# Patient Record
Sex: FEMALE | Race: BLACK OR AFRICAN AMERICAN | NOT HISPANIC OR LATINO | Employment: FULL TIME | URBAN - METROPOLITAN AREA
[De-identification: names, ages, dates, MRNs, and addresses within clinical notes are randomized per-mention and may not be internally consistent; named-entity substitution may affect disease eponyms.]

---

## 2017-04-04 ENCOUNTER — CLINICAL SUPPORT (OUTPATIENT)
Dept: REHABILITATION | Facility: HOSPITAL | Age: 47
End: 2017-04-04
Attending: FAMILY MEDICINE
Payer: MEDICAID

## 2017-04-04 DIAGNOSIS — M54.5 LOW BACK PAIN, UNSPECIFIED BACK PAIN LATERALITY, UNSPECIFIED CHRONICITY, WITH SCIATICA PRESENCE UNSPECIFIED: Primary | ICD-10-CM

## 2017-04-04 PROBLEM — M54.50 LOW BACK PAIN: Status: ACTIVE | Noted: 2017-04-04

## 2017-04-04 PROCEDURE — 97161 PT EVAL LOW COMPLEX 20 MIN: CPT | Mod: PN | Performed by: PHYSICAL THERAPIST

## 2017-04-04 NOTE — PLAN OF CARE
"  TIME RECORD    Date: 04/04/2017    Start Time:  1205  Stop Time:  1240    PROCEDURES:    TIMED  Procedure Time Min.    Start:  Stop:     Start:  Stop:     Start:  Stop:     Start:  Stop:          UNTIMED  Procedure Time Min.    Start:  Stop:     Start:  Stop:      Total Timed Minutes:  0  Total Timed Units:  0  Total Untimed Units:  1  Charges Billed/# of units:  1    OUTPATIENT PHYSICAL THERAPY   PATIENT EVALUATION  Onset Date: 3 months ago  Primary Diagnosis:   1. Low back pain, unspecified back pain laterality, unspecified chronicity, with sciatica presence unspecified       Treatment Diagnosis: low back pain with L sciatica  No past medical history on file.   Precautions: Standard  Prior Therapy: No  Medications: Keanu Cunha currently has no medications in their medication list.  Nutrition:  Normal  History of Present Illness: Pain comes from L lower back, causing a shooting pain all the way down to her foot. At times it is hard to work. Pt had a steroid shot at MD office and that did help to reduce her symptoms so she can now walk better, but she is still having some of the pain. Still has parasthesia in the LLE but it is not nearly as debilitating. MD did explain that she has a bulging disc in her lower back and scoliosis.   Prior Level of Function: Independent  Social History: Lives at home with children. Pt is a student in school. Mental health couselor part time.   Place of Residence (Steps/Adaptations): [place] is on second floor of home. Has not had as much trouble with that since she had the injection.   Functional Deficits Leading to Referral/Nature of Injury: diffiuclty with walking, car transfers, sitting in bad posture, sleeping/getting comfortable in bed  Patient Therapy Goals: Show me exercises i need to do to get better.     Subjective     Keanu Cunha states "help me get my youngness back".    Pain:  Location: back   Description: Sharp, Shooting and feels like a charley horse  Activities Which " Increase Pain: Sitting, Laying, Bending and Getting out of bed/chair  Activities Which Decrease Pain: heating pad and changing position, muscle relaxers (does not use very often), the steroid injection she got  Pain Scale: 2/10 at best 3/10 now  9/10 at worst    Objective     Posture: pt is attempting to sit with neutral spinal posture and seems in no apparent discomfort/awkwardness in doing so. There is a mild R convex scoliotic curve in the T/L spine.  Palpation: Hypertonicity in B thoracic paraspinals.  Sensation: WNL in lumbar dermatomes  DTRs: 2+ in B patellar and achilles DTR  Range of Motion/Strength:   Lumbar AROM: Pain/Dysfunction with Movement:   Flexion 60*    Extension 15*    Right side bending 20*    Left side bending 20*    Right rotation 50%    Left rotation 25%      STRENGTH  Right  Left  Hip flex   4/5  4/5  Hip ext   4/5  4-/5  Hip abd  4+/5  4/5  Hip add  4+/5  4+/5  Knee flex  4/5  4-/5  Knee ext  4/5  4-/5  DF   4+/5  4+/5    Flexibility: R hamstring 60*  Gait: Without AD  Analysis: Assistance Trendelenburg (weak hip abductors)  Bed Mobility:Independent  Transfers: Independent  Special Tests: SLR L positive at 45*  Other: PIVM: hypomobility lower thoracic through lumbar spine  Oswestry: 20/50  Treatment: Instruction in CHIN exercise to improve mobility and promote centralization of symptoms in the LLE. Discussed PT POC with the pt.    Assessment       Initial Assessment (Pertinent finding, problem list and factors affecting outcome): Pt is a 48 y/o female with c/o low back pain and LLE pain for 3 months. Pt reports at one time she had difficulty walking due to the severity of LLE pain, however since she had a steroid injection she is feeling much better and symptoms have improved. There is still some pain present that affects her tolerance for transfers, bed mobiltiy and sleeping. Pt will benefit from PT to address her impairments and improve her tolerance for aforementioned activities with  decreased pain.   Rehab Potiential: good    Short Term Goals (3 Weeks):   1. Pt will be compliant with improving ergonomics of car seat and study chair to promote neutral spine in sitting.  2. Pt will be able to perform car transfers without pain.  Long Term Goals (6 Weeks):   1. Pt will report LBP <2/10 with household chores.  2. Pt will report no symptoms distal to the L hip.  3. Pt will score </=12/50 on Oswestry.    Plan     Certification Period: 4/4/17 to 5/16/17  Recommended Treatment Plan: 2 times per week for 6 weeks: Electrical Stimulation IFC for pain control, Gait Training, Group Therapy, Manual Therapy, Moist Heat/ Ice, Neuromuscular Re-ed, Patient Education, Therapeutic Activites, Therapeutic Exercise and Ultrasound/Phonophoresis  Other Recommendations: NA      Therapist: Ellen Tadeo, PT    I CERTIFY THE NEED FOR THESE SERVICES FURNISHED UNDER THIS PLAN OF TREATMENT AND WHILE UNDER MY CARE    Physician's comments: ________________________________________________________________________________________________________________________________________________      Physician's Name: ___________________________________

## 2017-05-30 ENCOUNTER — DOCUMENTATION ONLY (OUTPATIENT)
Dept: REHABILITATION | Facility: HOSPITAL | Age: 47
End: 2017-05-30

## 2017-05-30 NOTE — PT/OT/SLP DISCHARGE
Name: Keanu Cunha   [facility] Number: [id number]   Age: 47 y.o.   Diagnosis: No diagnosis found.   Physician: No ref. provider found   Original Orders : PT eval and treat  Initial visit: 4/4/17  Date of Last visit: 4/4/17  Date of Discharge Note:  5/30/17  Total Visits Received: 1  Missed Visits: all    Subjective: Pt did not return after her evaluation.     Objective:  Treatment :    Included:NA        ROM:  NA    UE Strength: NA   LE Strength: NA        Assessment:  NA due to non compliance.   Goals Achieved: NA  Goals Not achieved and why: NA due to non attendance    Discharge reason : Non-Compliance with attendance    Discharge plan :Pt to follow-up with MD as planned    Plan:  This patient is discharged from Physical Therapy Services.

## 2019-06-06 ENCOUNTER — OCCUPATIONAL HEALTH (OUTPATIENT)
Dept: URGENT CARE | Facility: CLINIC | Age: 49
End: 2019-06-06

## 2019-06-06 PROCEDURE — 80305 PR DRUG SCREEN - 1: ICD-10-PCS | Mod: S$GLB,,, | Performed by: EMERGENCY MEDICINE

## 2019-06-06 PROCEDURE — 80305 DRUG TEST PRSMV DIR OPT OBS: CPT | Mod: S$GLB,,, | Performed by: EMERGENCY MEDICINE

## 2019-08-14 ENCOUNTER — OCCUPATIONAL HEALTH (OUTPATIENT)
Dept: URGENT CARE | Facility: CLINIC | Age: 49
End: 2019-08-14

## 2019-08-14 DIAGNOSIS — Z02.83 ENCOUNTER FOR EMPLOYMENT-RELATED DRUG TESTING: ICD-10-CM

## 2019-08-14 PROCEDURE — 80305 PR DRUG SCREEN - 1: ICD-10-PCS | Mod: S$GLB,,, | Performed by: EMERGENCY MEDICINE

## 2019-08-14 PROCEDURE — 80305 DRUG TEST PRSMV DIR OPT OBS: CPT | Mod: S$GLB,,, | Performed by: EMERGENCY MEDICINE

## 2020-04-29 ENCOUNTER — TELEPHONE (OUTPATIENT)
Dept: ORTHOPEDICS | Facility: CLINIC | Age: 50
End: 2020-04-29

## 2020-08-13 DIAGNOSIS — M54.50 LOW BACK PAIN: Primary | ICD-10-CM

## 2020-08-14 ENCOUNTER — HOSPITAL ENCOUNTER (OUTPATIENT)
Dept: RADIOLOGY | Facility: HOSPITAL | Age: 50
Discharge: HOME | End: 2020-08-14
Attending: NURSE PRACTITIONER
Payer: MEDICAID

## 2020-08-14 DIAGNOSIS — M54.50 LOW BACK PAIN: ICD-10-CM

## 2020-08-14 PROCEDURE — 72110 X-RAY EXAM L-2 SPINE 4/>VWS: CPT | Mod: TC,PO

## 2020-08-20 ENCOUNTER — HOSPITAL ENCOUNTER (OUTPATIENT)
Dept: RADIOLOGY | Facility: HOSPITAL | Age: 50
Discharge: HOME | End: 2020-08-20
Attending: NURSE PRACTITIONER
Payer: MEDICAID

## 2020-08-20 DIAGNOSIS — M54.2 CERVICALGIA: ICD-10-CM

## 2020-08-20 DIAGNOSIS — M54.2 CERVICALGIA: Primary | ICD-10-CM

## 2020-08-20 PROCEDURE — 72050 X-RAY EXAM NECK SPINE 4/5VWS: CPT | Mod: TC,PO

## 2020-09-21 ENCOUNTER — HOSPITAL ENCOUNTER (OUTPATIENT)
Dept: RADIOLOGY | Facility: HOSPITAL | Age: 50
Discharge: HOME | End: 2020-09-21
Attending: NURSE PRACTITIONER
Payer: MEDICAID

## 2020-09-21 DIAGNOSIS — M25.551 PAIN IN RIGHT HIP: ICD-10-CM

## 2020-09-21 DIAGNOSIS — M25.551 PAIN IN RIGHT HIP: Primary | ICD-10-CM

## 2020-09-21 PROCEDURE — 73502 X-RAY EXAM HIP UNI 2-3 VIEWS: CPT | Mod: TC,PO,RT

## 2020-09-22 DIAGNOSIS — Z12.31 ENCOUNTER FOR SCREENING MAMMOGRAM FOR MALIGNANT NEOPLASM OF BREAST: Primary | ICD-10-CM

## 2020-09-23 ENCOUNTER — HOSPITAL ENCOUNTER (OUTPATIENT)
Dept: RADIOLOGY | Facility: HOSPITAL | Age: 50
Discharge: HOME | End: 2020-09-23
Attending: NURSE PRACTITIONER
Payer: MEDICAID

## 2020-09-23 DIAGNOSIS — Z12.31 ENCOUNTER FOR SCREENING MAMMOGRAM FOR MALIGNANT NEOPLASM OF BREAST: ICD-10-CM

## 2020-09-23 PROCEDURE — 77067 SCR MAMMO BI INCL CAD: CPT | Mod: TC,PO

## 2021-05-06 ENCOUNTER — PATIENT MESSAGE (OUTPATIENT)
Dept: RESEARCH | Facility: HOSPITAL | Age: 51
End: 2021-05-06

## 2022-04-28 ENCOUNTER — TELEPHONE (OUTPATIENT)
Dept: OPTOMETRY | Facility: CLINIC | Age: 52
End: 2022-04-28
Payer: MEDICAID

## 2022-09-13 DIAGNOSIS — M25.511 RIGHT SHOULDER PAIN: Primary | ICD-10-CM

## 2022-09-21 DIAGNOSIS — M25.511 PAIN IN RIGHT SHOULDER: Primary | ICD-10-CM

## 2022-09-22 ENCOUNTER — HOSPITAL ENCOUNTER (OUTPATIENT)
Dept: RADIOLOGY | Facility: HOSPITAL | Age: 52
Discharge: HOME | End: 2022-09-22
Attending: NURSE PRACTITIONER
Payer: MEDICAID

## 2022-09-22 DIAGNOSIS — M25.512 PAIN IN LEFT SHOULDER: Primary | ICD-10-CM

## 2022-09-22 DIAGNOSIS — M25.512 PAIN IN LEFT SHOULDER: ICD-10-CM

## 2022-09-22 PROCEDURE — 73030 X-RAY EXAM OF SHOULDER: CPT | Mod: TC,PO,LT

## 2023-02-28 ENCOUNTER — TELEPHONE (OUTPATIENT)
Dept: GASTROENTEROLOGY | Facility: CLINIC | Age: 53
End: 2023-02-28
Payer: MEDICAID

## 2023-02-28 NOTE — TELEPHONE ENCOUNTER
Pt requesting gi appt for gerd, foul odor from throat.  Appt scheduled on  Monday, March 6, 2023 at 230pm. Clinic appt given and repeated correctly.

## 2023-02-28 NOTE — TELEPHONE ENCOUNTER
----- Message from Madyson Marin sent at 2/28/2023  3:18 PM CST -----  Contact: called at [phone]  Type: Needs Medical Advice  Who Called:  Pt  Symptoms (please be specific):  Gerd, Acid reflux, coughing, choking while drinking, foul order from throat  How long has patient had these symptoms:  on going for years  Best Call Back Number: [phone]  Additional Information: The pt is calling to get an appt to be seen for the symptoms listed above. Please call back and advise.

## 2023-03-06 ENCOUNTER — OFFICE VISIT (OUTPATIENT)
Dept: GASTROENTEROLOGY | Facility: CLINIC | Age: 53
End: 2023-03-06
Payer: MEDICAID

## 2023-03-06 VITALS
HEIGHT: 66 IN | SYSTOLIC BLOOD PRESSURE: 139 MMHG | BODY MASS INDEX: 27.33 KG/M2 | DIASTOLIC BLOOD PRESSURE: 74 MMHG | TEMPERATURE: 99 F | WEIGHT: 170.06 LBS | HEART RATE: 105 BPM

## 2023-03-06 DIAGNOSIS — R13.10 DYSPHAGIA, UNSPECIFIED TYPE: ICD-10-CM

## 2023-03-06 DIAGNOSIS — Z12.11 SCREEN FOR COLON CANCER: Primary | ICD-10-CM

## 2023-03-06 PROCEDURE — 99213 OFFICE O/P EST LOW 20 MIN: CPT | Mod: PBBFAC,PO | Performed by: INTERNAL MEDICINE

## 2023-03-06 RX ORDER — SODIUM, POTASSIUM,MAG SULFATES 17.5-3.13G
1 SOLUTION, RECONSTITUTED, ORAL ORAL DAILY
Qty: 1 KIT | Refills: 0 | Status: SHIPPED | OUTPATIENT
Start: 2023-03-06 | End: 2023-03-08

## 2023-03-06 RX ORDER — LISINOPRIL 10 MG/1
10 TABLET ORAL DAILY
COMMUNITY

## 2023-03-06 NOTE — PROGRESS NOTES
[facility]    CC: trouble swallowing    HPI 53 y.o. female with PMH of Hypertension with chief complaint of dysphagia with solid foods, not with liquids with associated foul smelling odor from her throat, reflux and nausea. Patient has tried Omeprazole 40 mg every day in the past without symptomatic improvement. She has never had an endoscopy and colonoscopy in the past. She denies family history of esophageal, stomach, or colon cancer. She denies NSAID use.     Past Medical History  Past Medical History:   Diagnosis Date    Anemia     Hypertension      Review of Systems  General ROS: negative for chills, fever or weight loss  Cardiovascular ROS: no chest pain or dyspnea on exertion  Gastrointestinal ROS: no abdominal pain, change in bowel habits, or black/ bloody stools    Physical Examination  There were no vitals taken for this visit.  General appearance: alert, cooperative, no distress  HENT: Normocephalic, atraumatic, neck symmetrical, no nasal discharge   Abdomen: soft, non-tender; bowel sounds normoactive; no organomegaly    Assessment: Patient presents with dysphagia with solid foods, foul smelling odor, and reflux. Previous PPI therapy without improvement in symptoms. Never had EGD or colonoscopy, or previous workup in the past. Patient's presentation concerning for achalasia vs Zenker diverticulum vs esophageal cancer.    Plan:  - Plan for EGD/colonoscopy on 3/13  - If results non-revealing, can consider esophogram or manometry testing  - Educated patient on meal hygiene - small bites, sitting up when eating, sips of water in between bites  - No PPI as patient has tried in the past with no improvement in symptoms  Total encounter time >45 min   Tacho Shafer MD   [address], [address]   [place] [address] [phone]

## 2023-03-06 NOTE — PATIENT INSTRUCTIONS
SUPREP Instructions    [facility] [address]  Clinic Office [phone]  Endoscopy Lab [phone]    You are scheduled for a EGD/Colonoscopy with Dr. Shafer  on Monday, March 13, 2023 at [facility] in [place].    Check in at the Hospital -1st floor, Information desk.   Call [phone] to reschedule.    An adult friend/family member must come with you to drive you home.  You cannot drive, take a taxi, Uber/Lyft or bus to leave the Endoscopy Center alone.  If you do not have someone to drive you home, your test will be cancelled.     Please follow the directions of your doctor if you take any pills that thin your blood. If you take these meds: Aggrenox, Brilinta, Effient, Eliquis, Lovenox, Plavix, Pletal, Pradaxa, Ticilid, Xarelto or Coumadin, let the doctor's office know.    DON'T: On the morning of the test do not take insulin or pills for diabetes.     DO: On the morning of the test, do take any pills for blood pressure, heart, anti-rejection and or seizures with a small sip of water. Bring any inhalers with you.    To have a good prep, you must follow these instructions - please do not use the directions from the pharmacy.    The doctor will send a prescription for the SUPREP.      The Day Before the test:    You can only drink CLEAR LIQUIDS the whole day before your test.  You can't eat any food for the whole day.    You CAN have:  Water, Coffee or decaf coffee (no milk or cream)  Tea  Soft drinks - regular and sugar free  Jello (green or yellow)  Apple Juice, white grape juice, white cranberry juice  Gatorade, Power Aid, Crystal Light, Mathew Aid  Lemonade and Limeade  Bouillon, clear soup  Snowball, popsicles  YOU CAN'T DRINK ANYTHING RED, PURPLE ORANGE OR BLUE   YOU CAN'T DRINK ALCOHOL  ONLY DRINK WHAT IS ON THE LIST      At 5 pm the night before your test:    Pour the 1st bottle of SUPREP into the cup provided in the box. Add water to the line on the cup and mix well.   Drink the whole cup and then drink 2 more full cups of water over 1 hour.  This can be easier to drink if it is cold. You can mix it 20 minutes ahead of time and place in the refrigerator before you drink it.  You must drink it within 30-45 minutes of mixing it.  Do NOT pour the drink over ice.  You can drink it with a straw.    The Day of the test - We will call you 2 days before your test to tell you what time to get there.    5 hours before you come to the hospital (this may be in the middle of the night)  Pour the 2nd bottle of SUPREP into the cup provided in the box. Add water to the line on the cup and mix well.  Drink the whole cup and then drink 2 more full cups of water over 1 hour.  It might be easier to drink if it is cold. You can mix it 20 minutes ahead of time and place in the refrigerator before you drink it.  You must drink it within 30-45 minutes of mixing it.  Do NOT pour the drink over ice.  You can drink it with a straw.    YOU CAN'T EAT OR DRINK ANYTHING ELSE ONCE YOU FINISH THE PREP    Leave all valuables and jewelry at home. You will be at the hospital for 2-4 hours.    Call the Endoscopy department at [phone] with any questions about your procedure.

## 2023-03-08 ENCOUNTER — TELEPHONE (OUTPATIENT)
Dept: ENDOSCOPY | Facility: HOSPITAL | Age: 53
End: 2023-03-08
Payer: MEDICAID

## 2023-03-08 NOTE — TELEPHONE ENCOUNTER
Spoke with patient about arrival time @ 0700.   EGD/Colon    Prep instructions reviewed: the day before the procedure, follow a clear liquid diet all day, then start the first 1/2 of prep at 5pm and take 2nd 1/2 of prep @ 0200.  Pt must be completely NPO when prep completed @ 0400.              Medications: Do not take Insulin or oral diabetic medications the day of the procedure.  Take as prescribed: heart, seizure and blood pressure medication in the morning with a sip of water (less than an ounce).  Take any breathing medications and bring inhalers to hospital with you Leave all valuables and jewelry at home.     Wear comfortable clothes to procedure to change into hospital gown You cannot drive for 24 hours after your procedure because you will receive sedation for your procedure to make you comfortable.  A ride must be provided at discharge.

## 2023-03-12 ENCOUNTER — ANESTHESIA EVENT (OUTPATIENT)
Dept: ENDOSCOPY | Facility: HOSPITAL | Age: 53
End: 2023-03-12
Payer: MEDICAID

## 2023-03-12 NOTE — H&P
[facility]    CC: trouble swallowing    HPI 53 y.o. female with PMH of Hypertension with chief complaint of dysphagia with solid foods, not with liquids with associated foul smelling odor from her throat, reflux and nausea. Patient has tried Omeprazole 40 mg every day in the past without symptomatic improvement. She has never had an endoscopy and colonoscopy in the past. She denies family history of esophageal, stomach, or colon cancer. She denies NSAID use.     Past Medical History  Past Medical History:   Diagnosis Date    Anemia     Hypertension      Review of Systems  General ROS: negative for chills, fever or weight loss  Cardiovascular ROS: no chest pain or dyspnea on exertion  Gastrointestinal ROS: no abdominal pain, change in bowel habits, or black/ bloody stools    Physical Examination  There were no vitals taken for this visit.  General appearance: alert, cooperative, no distress  HENT: Normocephalic, atraumatic, neck symmetrical, no nasal discharge   Abdomen: soft, non-tender; bowel sounds normoactive; no organomegaly    Assessment: Patient presents with dysphagia with solid foods, foul smelling odor, and reflux. Previous PPI therapy without improvement in symptoms. Never had EGD or colonoscopy, or previous workup in the past. Patient's presentation concerning for achalasia vs Zenker diverticulum vs esophageal cancer.    Plan:  - EGD/colonoscopy today  - If results non-revealing, can consider esophogram or manometry testing    [address], [address]   [place] [address] [phone]

## 2023-03-13 ENCOUNTER — ANESTHESIA (OUTPATIENT)
Dept: ENDOSCOPY | Facility: HOSPITAL | Age: 53
End: 2023-03-13
Payer: MEDICAID

## 2023-03-13 ENCOUNTER — HOSPITAL ENCOUNTER (OUTPATIENT)
Facility: HOSPITAL | Age: 53
Discharge: HOME | End: 2023-03-13
Attending: INTERNAL MEDICINE | Admitting: INTERNAL MEDICINE
Payer: MEDICAID

## 2023-03-13 VITALS
DIASTOLIC BLOOD PRESSURE: 61 MMHG | HEART RATE: 75 BPM | RESPIRATION RATE: 20 BRPM | SYSTOLIC BLOOD PRESSURE: 126 MMHG | TEMPERATURE: 99 F | OXYGEN SATURATION: 100 % | HEIGHT: 66 IN | WEIGHT: 170 LBS | BODY MASS INDEX: 27.32 KG/M2

## 2023-03-13 DIAGNOSIS — R13.19 ESOPHAGEAL DYSPHAGIA: Primary | ICD-10-CM

## 2023-03-13 DIAGNOSIS — Z12.11 SCREEN FOR COLON CANCER: ICD-10-CM

## 2023-03-13 PROCEDURE — D9220A PRA ANESTHESIA: Mod: ANES,,, | Performed by: ANESTHESIOLOGY

## 2023-03-13 PROCEDURE — D9220A PRA ANESTHESIA: ICD-10-PCS | Mod: ANES,,, | Performed by: ANESTHESIOLOGY

## 2023-03-13 PROCEDURE — 43239 EGD BIOPSY SINGLE/MULTIPLE: CPT | Performed by: INTERNAL MEDICINE

## 2023-03-13 PROCEDURE — D9220A PRA ANESTHESIA: ICD-10-PCS | Mod: CRNA,,, | Performed by: NURSE ANESTHETIST, CERTIFIED REGISTERED

## 2023-03-13 PROCEDURE — D9220A PRA ANESTHESIA: Mod: CRNA,,, | Performed by: NURSE ANESTHETIST, CERTIFIED REGISTERED

## 2023-03-13 PROCEDURE — 37000009 HC ANESTHESIA EA ADD 15 MINS: Performed by: INTERNAL MEDICINE

## 2023-03-13 PROCEDURE — 00813 ANES UPR LWR GI NDSC PX: CPT | Performed by: INTERNAL MEDICINE

## 2023-03-13 PROCEDURE — 45378 DIAGNOSTIC COLONOSCOPY: CPT | Performed by: INTERNAL MEDICINE

## 2023-03-13 PROCEDURE — 88305 TISSUE EXAM BY PATHOLOGIST: ICD-10-PCS | Mod: 26,,, | Performed by: PATHOLOGY

## 2023-03-13 PROCEDURE — 63600175 PHARM REV CODE 636 W HCPCS: Performed by: NURSE ANESTHETIST, CERTIFIED REGISTERED

## 2023-03-13 PROCEDURE — 27201012 HC FORCEPS, HOT/COLD, DISP: Performed by: INTERNAL MEDICINE

## 2023-03-13 PROCEDURE — 88305 TISSUE EXAM BY PATHOLOGIST: CPT | Mod: 26,,, | Performed by: PATHOLOGY

## 2023-03-13 PROCEDURE — 37000008 HC ANESTHESIA 1ST 15 MINUTES: Performed by: INTERNAL MEDICINE

## 2023-03-13 PROCEDURE — 25000003 PHARM REV CODE 250: Performed by: INTERNAL MEDICINE

## 2023-03-13 PROCEDURE — 88305 TISSUE EXAM BY PATHOLOGIST: CPT | Performed by: PATHOLOGY

## 2023-03-13 PROCEDURE — 25000003 PHARM REV CODE 250: Performed by: NURSE ANESTHETIST, CERTIFIED REGISTERED

## 2023-03-13 RX ORDER — SODIUM CHLORIDE 0.9 % (FLUSH) 0.9 %
10 SYRINGE (ML) INJECTION
Status: DISCONTINUED | OUTPATIENT
Start: 2023-03-13 | End: 2023-03-13 | Stop reason: HOSPADM

## 2023-03-13 RX ORDER — SODIUM CHLORIDE 9 MG/ML
INJECTION, SOLUTION INTRAVENOUS CONTINUOUS
Status: DISCONTINUED | OUTPATIENT
Start: 2023-03-13 | End: 2023-03-13 | Stop reason: HOSPADM

## 2023-03-13 RX ORDER — LIDOCAINE HCL/PF 100 MG/5ML
SYRINGE (ML) INTRAVENOUS
Status: DISCONTINUED | OUTPATIENT
Start: 2023-03-13 | End: 2023-03-13

## 2023-03-13 RX ORDER — PROPOFOL 10 MG/ML
VIAL (ML) INTRAVENOUS CONTINUOUS PRN
Status: DISCONTINUED | OUTPATIENT
Start: 2023-03-13 | End: 2023-03-13

## 2023-03-13 RX ORDER — PROPOFOL 10 MG/ML
VIAL (ML) INTRAVENOUS
Status: DISCONTINUED | OUTPATIENT
Start: 2023-03-13 | End: 2023-03-13

## 2023-03-13 RX ORDER — ONDANSETRON 2 MG/ML
4 INJECTION INTRAMUSCULAR; INTRAVENOUS DAILY PRN
Status: DISCONTINUED | OUTPATIENT
Start: 2023-03-13 | End: 2023-03-13 | Stop reason: HOSPADM

## 2023-03-13 RX ORDER — SODIUM CHLORIDE 9 MG/ML
INJECTION, SOLUTION INTRAVENOUS CONTINUOUS PRN
Status: DISCONTINUED | OUTPATIENT
Start: 2023-03-13 | End: 2023-03-13

## 2023-03-13 RX ADMIN — PROPOFOL 150 MCG/KG/MIN: 10 INJECTION, EMULSION INTRAVENOUS at 08:03

## 2023-03-13 RX ADMIN — LIDOCAINE HYDROCHLORIDE 50 MG: 20 INJECTION, SOLUTION INTRAVENOUS at 08:03

## 2023-03-13 RX ADMIN — PROPOFOL 50 MG: 10 INJECTION, EMULSION INTRAVENOUS at 08:03

## 2023-03-13 RX ADMIN — SODIUM CHLORIDE: 0.9 INJECTION, SOLUTION INTRAVENOUS at 07:03

## 2023-03-13 RX ADMIN — GLYCOPYRROLATE 0.2 MG: 0.2 INJECTION, SOLUTION INTRAMUSCULAR; INTRAVITREAL at 08:03

## 2023-03-13 RX ADMIN — SODIUM CHLORIDE: 9 INJECTION, SOLUTION INTRAVENOUS at 08:03

## 2023-03-13 NOTE — ANESTHESIA PREPROCEDURE EVALUATION
03/13/2023  Keanu Cunha is a 53 y.o., female.      Pre-op Assessment    I have reviewed the NPO Status.   I have reviewed the Medications.     Review of Systems  Anesthesia Hx:  No problems with previous Anesthesia  Denies Family Hx of Anesthesia complications.   Denies Personal Hx of Anesthesia complications.   Hematology/Oncology:  Hematology Normal   Oncology Normal     EENT/Dental:EENT/Dental Normal   Cardiovascular:   Hypertension    Pulmonary:  Pulmonary Normal    Renal/:  Renal/ Normal     Hepatic/GI:  Hepatic/GI Normal    Musculoskeletal:  Spine Disorders: lumbar    Neurological:  Neurology Normal    Endocrine:  Endocrine Normal    Dermatological:  Skin Normal    Psych:  Psychiatric Normal           Physical Exam  General: Alert and Oriented    Airway:  Mallampati: II   Mouth Opening: Normal  TM Distance: Normal  Tongue: Normal  Neck ROM: Normal ROM    Chest/Lungs:  Clear to auscultation, Normal Respiratory Rate    Heart:  Rate: Normal  Rhythm: Regular Rhythm  Sounds: Normal        Anesthesia Plan  Type of Anesthesia, risks & benefits discussed:    Anesthesia Type: Gen Natural Airway  Intra-op Monitoring Plan: Standard ASA Monitors  Post Op Pain Control Plan: multimodal analgesia and IV/PO Opioids PRN  Induction:  IV  Informed Consent: Informed consent signed with the Patient and all parties understand the risks and agree with anesthesia plan.  All questions answered.   ASA Score: 2  Day of Surgery Review of History & Physical: H&P Update referred to the surgeon/provider.    Ready For Surgery From Anesthesia Perspective.     .

## 2023-03-13 NOTE — TRANSFER OF CARE
"Anesthesia Transfer of Care Note    Patient: Keanu Cunha    Procedure(s) Performed: Procedure(s) (LRB):  COLONOSCOPY (N/A)  EGD (ESOPHAGOGASTRODUODENOSCOPY) (N/A)    Patient location: PACU    Anesthesia Type: general    Transport from OR: Transported from OR on room air with adequate spontaneous ventilation    Post pain: adequate analgesia    Post assessment: no apparent anesthetic complications and tolerated procedure well    Post vital signs: stable    Level of consciousness: awake and responds to stimulation    Nausea/Vomiting: no nausea/vomiting    Complications: none    Transfer of care protocol was followed      Last vitals:   Visit Vitals  BP (!) 143/108 (BP Location: Left arm, Patient Position: Lying)   Pulse 88   Temp 36.6 °C (97.9 °F) (Temporal)   Resp 16   Ht 5' 6" (1.676 m)   Wt 77.1 kg (170 lb)   SpO2 100%   Breastfeeding No   BMI 27.44 kg/m²     "

## 2023-03-13 NOTE — PROVATION PATIENT INSTRUCTIONS
Discharge Summary/Instructions after an Endoscopic Procedure  Patient Name: Keanu Cunha  Patient MRN: [id number]  Patient YOB: 1970 Monday, March 13, 2023  Tacho Shafer MD  Dear patient,  As a result of recent federal legislation (The Federal Cures Act), you may   receive lab or pathology results from your procedure in your [facility]   account before your physician is able to contact you. Your physician or   their representative will relay the results to you with their   recommendations at their soonest availability.  Thank you,  Your health is very important to us during the Covid Crisis. Following your   procedure today, you will receive a daily text for 2 weeks asking about   signs or symptoms of Covid 19.  Please respond to this text when you   receive it so we can follow up and keep you as safe as possible.   RESTRICTIONS:  During your procedure today, you received medications for sedation.  These   medications may affect your judgment, balance and coordination.  Therefore,   for 24 hours, you have the following restrictions:   - DO NOT drive a car, operate machinery, make legal/financial decisions,   sign important papers or drink alcohol.    ACTIVITY:  Today: no heavy lifting, straining or running due to procedural   sedation/anesthesia.  The following day: return to full activity including work.  DIET:  Eat and drink normally unless instructed otherwise.     TREATMENT FOR COMMON SIDE EFFECTS:  - Mild abdominal pain, nausea, belching, bloating or excessive gas:  rest,   eat lightly and use a heating pad.  - Sore Throat: treat with throat lozenges and/or gargle with warm salt   water.  - Because air was used during the procedure, expelling large amounts of air   from your rectum or belching is normal.  - If a bowel prep was taken, you may not have a bowel movement for 1-3 days.    This is normal.  SYMPTOMS TO WATCH FOR AND REPORT TO YOUR PHYSICIAN:  1. Abdominal pain or bloating, other than gas  cramps.  2. Chest pain.  3. Back pain.  4. Signs of infection such as: chills or fever occurring within 24 hours   after the procedure.  5. Rectal bleeding, which would show as bright red, maroon, or black stools.   (A tablespoon of blood from the rectum is not serious, especially if   hemorrhoids are present.)  6. Vomiting.  7. Weakness or dizziness.  GO DIRECTLY TO THE NEAREST EMERGENCY ROOM IF YOU HAVE ANY OF THE FOLLOWING:      Difficulty breathing              Chills and/or fever over 101 F   Persistent vomiting and/or vomiting blood   Severe abdominal pain   Severe chest pain   Black, tarry stools   Bleeding- more than one tablespoon   Any other symptom or condition that you feel may need urgent attention  Your doctor recommends these additional instructions:  If any biopsies were taken, your doctors clinic will contact you in 1 to 2   weeks with any results.  - Discharge to home  - Resume previous diet and medications  - Condition stable   - The signs and symptoms of potential delayed complications were discussed   with the patient. If signs or symptoms of these complications develop, call   the [facility] at [phone].   - Return to normal activities tomorrow.  Written discharge instructions were   provided to the patient.   - Repeat colonoscopy in 10 years for screening purposes.  For questions, problems or results please call your physician - Tacho Shafer MD.  EMERGENCY PHONE NUMBER: [phone],  LAB RESULTS: [phone]  IF A COMPLICATION OR EMERGENCY SITUATION ARISES AND YOU ARE UNABLE TO REACH   YOUR PHYSICIAN - GO DIRECTLY TO THE EMERGENCY ROOM.  MD Tacho Wright MD  3/13/2023 9:24:50 AM  This report has been verified and signed electronically.  Dear patient,  As a result of recent federal legislation (The Federal Cures Act), you may   receive lab or pathology results from your procedure in your [facility]   account before your physician is able to contact  you. Your physician or   their representative will relay the results to you with their   recommendations at their soonest availability.  Thank you,  PROVATION

## 2023-03-13 NOTE — PROVATION PATIENT INSTRUCTIONS
Discharge Summary/Instructions after an Endoscopic Procedure  Patient Name: Keanu Cunha  Patient MRN: [id number]  Patient YOB: 1970 Monday, March 13, 2023  Tacho Shafer MD  Dear patient,  As a result of recent federal legislation (The Federal Cures Act), you may   receive lab or pathology results from your procedure in your [facility]   account before your physician is able to contact you. Your physician or   their representative will relay the results to you with their   recommendations at their soonest availability.  Thank you,  Your health is very important to us during the Covid Crisis. Following your   procedure today, you will receive a daily text for 2 weeks asking about   signs or symptoms of Covid 19.  Please respond to this text when you   receive it so we can follow up and keep you as safe as possible.   RESTRICTIONS:  During your procedure today, you received medications for sedation.  These   medications may affect your judgment, balance and coordination.  Therefore,   for 24 hours, you have the following restrictions:   - DO NOT drive a car, operate machinery, make legal/financial decisions,   sign important papers or drink alcohol.    ACTIVITY:  Today: no heavy lifting, straining or running due to procedural   sedation/anesthesia.  The following day: return to full activity including work.  DIET:  Eat and drink normally unless instructed otherwise.     TREATMENT FOR COMMON SIDE EFFECTS:  - Mild abdominal pain, nausea, belching, bloating or excessive gas:  rest,   eat lightly and use a heating pad.  - Sore Throat: treat with throat lozenges and/or gargle with warm salt   water.  - Because air was used during the procedure, expelling large amounts of air   from your rectum or belching is normal.  - If a bowel prep was taken, you may not have a bowel movement for 1-3 days.    This is normal.  SYMPTOMS TO WATCH FOR AND REPORT TO YOUR PHYSICIAN:  1. Abdominal pain or bloating, other than gas  cramps.  2. Chest pain.  3. Back pain.  4. Signs of infection such as: chills or fever occurring within 24 hours   after the procedure.  5. Rectal bleeding, which would show as bright red, maroon, or black stools.   (A tablespoon of blood from the rectum is not serious, especially if   hemorrhoids are present.)  6. Vomiting.  7. Weakness or dizziness.  GO DIRECTLY TO THE NEAREST EMERGENCY ROOM IF YOU HAVE ANY OF THE FOLLOWING:      Difficulty breathing              Chills and/or fever over 101 F   Persistent vomiting and/or vomiting blood   Severe abdominal pain   Severe chest pain   Black, tarry stools   Bleeding- more than one tablespoon   Any other symptom or condition that you feel may need urgent attention  Your doctor recommends these additional instructions:  If any biopsies were taken, your doctors clinic will contact you in 1 to 2   weeks with any results.  - Resume previous diet.   - Continue present medications.   - Await pathology results.   - Perform a colonoscopy today.   - If pathology is negative for EoE as expected then plan conservative   management with dietary modifications.  For questions, problems or results please call your physician - Tacho Shafer MD.  EMERGENCY PHONE NUMBER: [phone],  LAB RESULTS: [phone]  IF A COMPLICATION OR EMERGENCY SITUATION ARISES AND YOU ARE UNABLE TO REACH   YOUR PHYSICIAN - GO DIRECTLY TO THE EMERGENCY ROOM.  MD Tacho Wright MD  3/13/2023 9:19:50 AM  This report has been verified and signed electronically.  Dear patient,  As a result of recent federal legislation (The Federal Cures Act), you may   receive lab or pathology results from your procedure in your [facility]   account before your physician is able to contact you. Your physician or   their representative will relay the results to you with their   recommendations at their soonest availability.  Thank you,  [employer]ATION

## 2023-03-13 NOTE — ANESTHESIA POSTPROCEDURE EVALUATION
Anesthesia Post Evaluation    Patient: Keanu Cunha    Procedure(s) Performed: Procedure(s) (LRB):  COLONOSCOPY (N/A)  EGD (ESOPHAGOGASTRODUODENOSCOPY) (N/A)    Final Anesthesia Type: general      Patient location during evaluation: GI PACU  Patient participation: Yes- Able to Participate  Level of consciousness: awake  Post-procedure vital signs: reviewed and stable  Pain management: adequate  Airway patency: patent    PONV status at discharge: No PONV  Anesthetic complications: no      Cardiovascular status: blood pressure returned to baseline and hemodynamically stable  Respiratory status: unassisted, spontaneous ventilation and room air  Hydration status: euvolemic  Follow-up not needed.          Vitals Value Taken Time   /59 03/13/23 0916   Temp 97.8 03/13/23 0916   Pulse 87 03/13/23 0916   Resp 18 03/13/23 0916   SpO2 98 03/13/23 0916         No case tracking events are documented in the log.      Pain/Henrry Score: Henrry Score: 9 (3/13/2023  9:14 AM)

## 2023-03-15 PROBLEM — R13.19 ESOPHAGEAL DYSPHAGIA: Status: ACTIVE | Noted: 2023-03-15

## 2023-03-16 LAB
FINAL PATHOLOGIC DIAGNOSIS: NORMAL
GROSS: NORMAL
Lab: NORMAL

## 2023-03-22 ENCOUNTER — OFFICE VISIT (OUTPATIENT)
Dept: GASTROENTEROLOGY | Facility: CLINIC | Age: 53
End: 2023-03-22
Payer: MEDICAID

## 2023-03-22 ENCOUNTER — TELEPHONE (OUTPATIENT)
Dept: GASTROENTEROLOGY | Facility: CLINIC | Age: 53
End: 2023-03-22
Payer: MEDICAID

## 2023-03-22 DIAGNOSIS — R13.10 DYSPHAGIA, UNSPECIFIED TYPE: Primary | ICD-10-CM

## 2023-03-22 NOTE — TELEPHONE ENCOUNTER
----- Message from Tacho Shafer MD sent at 3/22/2023  9:43 AM CDT -----  Please schedule for telemedicine visit today to review path results and symptoms

## 2023-03-22 NOTE — PROGRESS NOTES
Established Patient - Audio Only Telehealth Visit     The patient location is: home  The chief complaint leading to consultation is: dysphagia  Visit type: Virtual visit with audio only (telephone)  Total time spent with patient: 10 minutes  Total encounter time >30 min      The reason for the audio only service rather than synchronous audio and video virtual visit was related to technical difficulties or patient preference/necessity.     Each patient to whom I provide medical services by telemedicine is:  (1) informed of the relationship between the physician and patient and the respective role of any other health care provider with respect to management of the patient; and (2) notified that they may decline to receive medical services by telemedicine and may withdraw from such care at any time. Patient verbally consented to receive this service via voice-only telephone call.     HPI:   53 y.o. female with PMH of Hypertension with chief complaint of dysphagia with solid foods, not with liquids with associated foul smelling odor from her throat, reflux and nausea. Patient has tried Omeprazole 40 mg every day in the past without symptomatic improvement. She has never had an endoscopy and colonoscopy in the past. She denies family history of esophageal, stomach, or colon cancer. She denies NSAID use.    Patient underwent EGD on 3/13 with normal esophagus, normal stomach, and normal duodenum. Biopsies at that time negative for eosinophilic esophagitis, and without any other diagnostic abnormality.    Today, patient reports that her symptoms of dysphagia are improved with lifestyle modifications, although the foul odor and reflux are still present. She is adherent to PPI therapy.      Assessment:  52 yo F with chronic, persistent dysphagia with solid foods, foul smelling odor, and reflux. EGD on 3/13 with normal esophagus, biopsies at that time negative for eosinophilic esophagitis, no other diagnostic abnormality.  Symptoms of dysphagia improved today with lifestyle modifications such as small bites with food, sips of water with every bite.     Plan:  - Patient wishes to continue with conservative management at this time  - Continue conservative therapy at the moment: PPI therapy, PRN pepcid or tums, and lifestyle modifications as above  - If symptoms worsen, can consider esophagram or esophageal manometry for further workup     This service was not originating from a related E/M service provided within the previous 7 days nor will  to an E/M service or procedure within the next 24 hours or my soonest available appointment.  Prevailing standard of care was able to be met in this audio-only visit.

## 2023-04-17 DIAGNOSIS — Z12.31 ENCOUNTER FOR SCREENING MAMMOGRAM FOR MALIGNANT NEOPLASM OF BREAST: Primary | ICD-10-CM

## 2023-04-28 ENCOUNTER — HOSPITAL ENCOUNTER (OUTPATIENT)
Dept: RADIOLOGY | Facility: HOSPITAL | Age: 53
Discharge: HOME | End: 2023-04-28
Attending: NURSE PRACTITIONER
Payer: MEDICAID

## 2023-04-28 DIAGNOSIS — Z12.31 ENCOUNTER FOR SCREENING MAMMOGRAM FOR MALIGNANT NEOPLASM OF BREAST: ICD-10-CM

## 2023-04-28 PROCEDURE — 77067 SCR MAMMO BI INCL CAD: CPT | Mod: TC,PO

## 2023-11-08 NOTE — TELEPHONE ENCOUNTER
----- Message from Yi Arana MA sent at 4/29/2020  1:58 PM CDT -----  Contact: self  Patient need to speak to nurse regarding scheduling a new patient appointment for wrist pain     Patient was informed about her insurance but states Medicaid gave Dr. Esparza       Please call to advice [phone]     Patient picked up aids/ needed to charge them when she gets home to see if working properly,they were dead.

## 2023-12-01 ENCOUNTER — TELEPHONE (OUTPATIENT)
Dept: GASTROENTEROLOGY | Facility: CLINIC | Age: 53
End: 2023-12-01
Payer: MEDICAID

## 2023-12-01 NOTE — TELEPHONE ENCOUNTER
Clinic appt scheduled with pt on Monday, December 11, 2023 at 145pm, per pt request.  Pt given clinic address and repeated correctly.

## 2023-12-01 NOTE — TELEPHONE ENCOUNTER
----- Message from Kayla Loaiza sent at 12/1/2023 10:08 AM CST -----  Type:  Sooner Appointment Request    Caller is requesting a sooner appointment.  Caller declined first available appointment listed below.  Caller will not accept being placed on the waitlist and is requesting a message be sent to doctor.  Name of Caller:pt   When is the first available appointment?  Symptoms:Constipation and breath odor   Would the patient rather a call back or a response via [facility]? Call   Best Call Back Number:[phone]  Additional Information:

## 2023-12-08 ENCOUNTER — TELEPHONE (OUTPATIENT)
Dept: GASTROENTEROLOGY | Facility: CLINIC | Age: 53
End: 2023-12-08
Payer: MEDICAID

## 2023-12-11 ENCOUNTER — OFFICE VISIT (OUTPATIENT)
Dept: GASTROENTEROLOGY | Facility: CLINIC | Age: 53
End: 2023-12-11
Payer: MEDICAID

## 2023-12-11 ENCOUNTER — TELEPHONE (OUTPATIENT)
Dept: GASTROENTEROLOGY | Facility: CLINIC | Age: 53
End: 2023-12-11
Payer: MEDICAID

## 2023-12-11 VITALS
SYSTOLIC BLOOD PRESSURE: 127 MMHG | HEART RATE: 95 BPM | WEIGHT: 174.25 LBS | DIASTOLIC BLOOD PRESSURE: 92 MMHG | HEIGHT: 66 IN | BODY MASS INDEX: 28 KG/M2

## 2023-12-11 DIAGNOSIS — R13.10 DYSPHAGIA, UNSPECIFIED TYPE: Primary | ICD-10-CM

## 2023-12-11 PROCEDURE — 99214 OFFICE O/P EST MOD 30 MIN: CPT | Mod: S$PBB,,, | Performed by: INTERNAL MEDICINE

## 2023-12-11 PROCEDURE — 99214 PR OFFICE/OUTPT VISIT, EST, LEVL IV, 30-39 MIN: ICD-10-PCS | Mod: S$PBB,,, | Performed by: INTERNAL MEDICINE

## 2023-12-11 PROCEDURE — 99213 OFFICE O/P EST LOW 20 MIN: CPT | Mod: PBBFAC,PO | Performed by: INTERNAL MEDICINE

## 2023-12-11 PROCEDURE — 3008F BODY MASS INDEX DOCD: CPT | Mod: CPTII,,, | Performed by: INTERNAL MEDICINE

## 2023-12-11 PROCEDURE — 3074F SYST BP LT 130 MM HG: CPT | Mod: CPTII,,, | Performed by: INTERNAL MEDICINE

## 2023-12-11 PROCEDURE — 1159F MED LIST DOCD IN RCRD: CPT | Mod: CPTII,,, | Performed by: INTERNAL MEDICINE

## 2023-12-11 PROCEDURE — 3080F PR MOST RECENT DIASTOLIC BLOOD PRESSURE >= 90 MM HG: ICD-10-PCS | Mod: CPTII,,, | Performed by: INTERNAL MEDICINE

## 2023-12-11 PROCEDURE — 4010F ACE/ARB THERAPY RXD/TAKEN: CPT | Mod: CPTII,,, | Performed by: INTERNAL MEDICINE

## 2023-12-11 PROCEDURE — 99999 PR PBB SHADOW E&M-EST. PATIENT-LVL III: CPT | Mod: PBBFAC,,, | Performed by: INTERNAL MEDICINE

## 2023-12-11 PROCEDURE — 4010F PR ACE/ARB THEARPY RXD/TAKEN: ICD-10-PCS | Mod: CPTII,,, | Performed by: INTERNAL MEDICINE

## 2023-12-11 PROCEDURE — 1159F PR MEDICATION LIST DOCUMENTED IN MEDICAL RECORD: ICD-10-PCS | Mod: CPTII,,, | Performed by: INTERNAL MEDICINE

## 2023-12-11 PROCEDURE — 3008F PR BODY MASS INDEX (BMI) DOCUMENTED: ICD-10-PCS | Mod: CPTII,,, | Performed by: INTERNAL MEDICINE

## 2023-12-11 PROCEDURE — 99999 PR PBB SHADOW E&M-EST. PATIENT-LVL III: ICD-10-PCS | Mod: PBBFAC,,, | Performed by: INTERNAL MEDICINE

## 2023-12-11 PROCEDURE — 3074F PR MOST RECENT SYSTOLIC BLOOD PRESSURE < 130 MM HG: ICD-10-PCS | Mod: CPTII,,, | Performed by: INTERNAL MEDICINE

## 2023-12-11 PROCEDURE — 3080F DIAST BP >= 90 MM HG: CPT | Mod: CPTII,,, | Performed by: INTERNAL MEDICINE

## 2023-12-11 RX ORDER — AMOXICILLIN 500 MG/1
500 CAPSULE ORAL
COMMUNITY

## 2023-12-11 NOTE — LETTER
December 11, 2023      [facility]  [address] [place] [address]  [place] [address]  Phone: [phone]  Fax: [phone]       Patient: Keanu Cunha   YOB: 1970  Date of Visit: 12/11/2023    To Whom It May Concern:    Amy Cunha  was at [facility] on 12/11/2023. The patient may return to work on 12/12/2023 with no restrictions. If you have any questions or concerns, or if I can be of further assistance, please do not hesitate to contact me.    Sincerely,    Abimbola Hickey LPN

## 2023-12-11 NOTE — PATIENT INSTRUCTIONS
Call [phone] to schedule swallow study.    You will be contacted to schedule speech pathology appt.

## 2023-12-11 NOTE — PROGRESS NOTES
"[facility]    CC: Dysphagia    HPI 53 y.o. female with chronic, persistent dysphagia with solid foods and associated foul smelling odor. EGD on 3/13 was normal with negative biopsies for EoE. Dysphagia improved on PPI initially and she is here now due to worsening odor and return of dysphagia. She reports that she stopped her PPI as she does not like to take medications daily if she does not have to. However, she has noted worsening in the odor from her mouth as well as intermittent dysphagia. Additionally, she reports intermittent epigastric/LUQ abdominal pain that resolves after 15 minutes or so. She denies any weight loss, nausea, or emesis.     Past Medical History  HTN    Physical Examination  BP (!) 127/92 (BP Location: Left arm, Patient Position: Sitting, BP Method: Medium (Automatic))   Pulse 95   Ht 5' 6" (1.676 m)   Wt 79.1 kg (174 lb 4.4 oz)   BMI 28.13 kg/m²   General appearance: alert, cooperative, no distress  Abdomen: soft, non-tender; bowel sounds normoactive; no organomegaly    Prior Endoscopy  EGD (3/2023): normal esophagus normal with negative biopsies for EoE  Colonoscopy (3/2023): normal index average risk screening exam; repeat 2033    Assessment:   Mrs. Keanu Cunha is a 53 year old female with:  Dysphagia with associated foul smelling odor: symptoms initially improved on PPI, but have now returned with patient cessation of medication (chronic problem with exacerbation)  Colon cancer screening due 2033    Plan:  - Ordered barium swallow study and SLP consult for evaluation   - Initiating Pepcid nightly PRN   - If above workup is negative with continued symptomology, will plan for ENT referral     Tacho Shafer MD   [address] [place] [address], [address]  [place] [address] [phone]      "

## 2023-12-15 ENCOUNTER — TELEPHONE (OUTPATIENT)
Dept: GASTROENTEROLOGY | Facility: CLINIC | Age: 53
End: 2023-12-15
Payer: MEDICAID

## 2023-12-15 NOTE — TELEPHONE ENCOUNTER
----- Message from Minnie Villarreal MA sent at 12/13/2023  7:57 AM CST -----  Regarding: RE: referral  Jakob Daily, please contact out therapy [phone]. Pt will not be seen in [facility] due to no dx of head/neck cancer  ----- Message -----  From: Abimbola Hickey LPN  Sent: 12/11/2023   3:22 PM CST  To: [facility] [place] Support Staff  Subject: referral                                         Speech Pathology referral placed, please contact pt to schedule.  Thanks.

## 2023-12-19 ENCOUNTER — TELEPHONE (OUTPATIENT)
Dept: GASTROENTEROLOGY | Facility: CLINIC | Age: 53
End: 2023-12-19
Payer: MEDICAID

## 2023-12-19 NOTE — TELEPHONE ENCOUNTER
Pt states she is waiting on ENT to schedule swallow study.  Scheduling number given to pt.  Also advised to contact this clinic if appt not scheduled.

## 2023-12-19 NOTE — TELEPHONE ENCOUNTER
----- Message from Warner Velez sent at 12/19/2023  3:10 PM CST -----  Type:  Scope    Who Called: pt  Would the patient rather a call back or a response via [facility]? call  Best Call Back Number:  [phone]  Additional Information: patient calling to schedule a scope for her throat

## 2023-12-20 ENCOUNTER — TELEPHONE (OUTPATIENT)
Dept: GASTROENTEROLOGY | Facility: CLINIC | Age: 53
End: 2023-12-20
Payer: MEDICAID

## 2023-12-20 NOTE — TELEPHONE ENCOUNTER
Attempt to schedule appts for pt, number received at [facility] given [phone].

## 2023-12-20 NOTE — TELEPHONE ENCOUNTER
"Pt stating "I've been trying to schedule appts with speech and barium swallow,, unsuccessful.  Pt notified she will be contacted with additional information.   "

## 2023-12-20 NOTE — TELEPHONE ENCOUNTER
Pt given scheduling number of [phone] to contact to schedule in [facility].

## 2024-01-18 ENCOUNTER — TELEPHONE (OUTPATIENT)
Dept: GASTROENTEROLOGY | Facility: CLINIC | Age: 54
End: 2024-01-18
Payer: MEDICAID

## 2024-01-18 NOTE — TELEPHONE ENCOUNTER
----- Message from Tacho Shafer MD sent at 1/12/2024  6:01 PM CST -----  Please schedule this patient for an EGD with me as her swallowing evaluation was normal except for delay in transit through the mid-esophagus. We will need to evaluate for a stenosis/stricture in this area

## 2024-01-19 ENCOUNTER — TELEPHONE (OUTPATIENT)
Dept: GASTROENTEROLOGY | Facility: CLINIC | Age: 54
End: 2024-01-19
Payer: MEDICAID

## 2024-01-19 DIAGNOSIS — K22.2 ESOPHAGEAL STENOSIS: Primary | ICD-10-CM

## 2024-01-19 NOTE — TELEPHONE ENCOUNTER
----- Message from Kelin Bryan sent at 1/19/2024  1:35 PM CST -----  Regarding: call back  Contact: [phone]  Who Called:pt  Regarding:returning a call to [facility] to schedule EGD  Would the patient rather a call back or [facility]IA [facility]?  Best Call Back number:[phone]  Additional Info:

## 2024-01-19 NOTE — TELEPHONE ENCOUNTER
"Egd scheduled with pt on Monday, February 26, 2024, per pt request.  Pt states "I have been notified by my employer to lesson my appts, I'm already off on this date"  Pt notified Endoscopy staff will contact 2-3 days prior to procedure to give arrival time.  required post procedure due to anesthesia. Pt denies taking GLP-1 meds.    You are scheduled for an EGD on _________________________________    You should eat light meals the day before the procedure and nothing to eat or drink after midnight the night before your procedure.    You will need to be at the 1st floor admission desk at the hospital on ________________________.   "

## 2024-01-19 NOTE — TELEPHONE ENCOUNTER
----- Message from Bere Frazier sent at 1/18/2024  2:47 PM CST -----  Needs advice from nurse:      Who Called:pt  Regarding:returning a call to [facility] to schedule EGD  Would the patient rather a call back or [facility]IA [facility]?  Best Call Back number:[phone]  Additional Info:

## 2024-02-19 ENCOUNTER — TELEPHONE (OUTPATIENT)
Dept: GASTROENTEROLOGY | Facility: CLINIC | Age: 54
End: 2024-02-19

## 2024-02-19 NOTE — TELEPHONE ENCOUNTER
----- Message from Tierra Perez sent at 2/19/2024  1:32 PM CST -----  Type:  Appointment Request      Name of Caller:Pt   When is the first available appointment?02/26   Symptoms:  Best Call Back Number: [phone]  Additional Information: need to reschedule appt due to insurance

## 2024-08-29 ENCOUNTER — TELEPHONE (OUTPATIENT)
Dept: GASTROENTEROLOGY | Facility: CLINIC | Age: 54
End: 2024-08-29
Payer: COMMERCIAL

## 2024-08-29 NOTE — TELEPHONE ENCOUNTER
----- Message from Annel Dillard sent at 8/29/2024  9:55 AM CDT -----  Type:  Endoscopy     Who Called: Pt   Who Left Message for Patient: requesting [facility]   Does the patient know what this is regarding?: discuss endoscopy  Would the patient rather a call back or a response via [facility]? Call   Best Call Back Number: [phone]   Additional Information:  looking for locations closer to home

## 2024-08-29 NOTE — TELEPHONE ENCOUNTER
Pt with [employer] insurance requesting to schedule egd.  Informed insurance is not accepted at this facility.  Advised to contact [facility]  gastro.  Pt acknowledged understanding.